# Patient Record
Sex: FEMALE | Race: WHITE | NOT HISPANIC OR LATINO | URBAN - METROPOLITAN AREA
[De-identification: names, ages, dates, MRNs, and addresses within clinical notes are randomized per-mention and may not be internally consistent; named-entity substitution may affect disease eponyms.]

---

## 2019-08-05 ENCOUNTER — LAB REQUISITION (OUTPATIENT)
Dept: LAB | Facility: HOSPITAL | Age: 29
End: 2019-08-05

## 2019-08-05 DIAGNOSIS — Z00.00 ROUTINE GENERAL MEDICAL EXAMINATION AT A HEALTH CARE FACILITY: ICD-10-CM

## 2019-08-05 PROCEDURE — 36415 COLL VENOUS BLD VENIPUNCTURE: CPT | Performed by: SPECIALIST

## 2019-09-02 ENCOUNTER — LAB REQUISITION (OUTPATIENT)
Dept: LAB | Facility: HOSPITAL | Age: 29
End: 2019-09-02

## 2019-09-02 DIAGNOSIS — Z00.00 ROUTINE GENERAL MEDICAL EXAMINATION AT A HEALTH CARE FACILITY: ICD-10-CM

## 2019-09-02 LAB — PROGEST SERPL-MCNC: 33.9 NG/ML

## 2019-09-02 PROCEDURE — 84144 ASSAY OF PROGESTERONE: CPT | Performed by: SPECIALIST

## 2019-09-02 PROCEDURE — 36415 COLL VENOUS BLD VENIPUNCTURE: CPT | Performed by: SPECIALIST

## 2019-10-09 ENCOUNTER — LAB REQUISITION (OUTPATIENT)
Dept: LAB | Facility: HOSPITAL | Age: 29
End: 2019-10-09

## 2019-10-09 DIAGNOSIS — Z00.00 ROUTINE GENERAL MEDICAL EXAMINATION AT A HEALTH CARE FACILITY: ICD-10-CM

## 2019-10-09 LAB — HCG INTACT+B SERPL-ACNC: 32.93 MIU/ML

## 2019-10-09 PROCEDURE — 84702 CHORIONIC GONADOTROPIN TEST: CPT | Performed by: SPECIALIST

## 2019-10-09 PROCEDURE — 36415 COLL VENOUS BLD VENIPUNCTURE: CPT | Performed by: SPECIALIST

## 2019-10-24 ENCOUNTER — LAB REQUISITION (OUTPATIENT)
Dept: LAB | Facility: HOSPITAL | Age: 29
End: 2019-10-24

## 2019-10-24 ENCOUNTER — TRANSCRIBE ORDERS (OUTPATIENT)
Dept: LAB | Facility: HOSPITAL | Age: 29
End: 2019-10-24

## 2019-10-24 DIAGNOSIS — O02.1 MISSED ABORTION: Primary | ICD-10-CM

## 2019-10-24 DIAGNOSIS — Z00.00 ROUTINE GENERAL MEDICAL EXAMINATION AT A HEALTH CARE FACILITY: ICD-10-CM

## 2019-10-24 DIAGNOSIS — N96 HABITUAL ABORTER: ICD-10-CM

## 2019-10-24 LAB
HCG INTACT+B SERPL-ACNC: 152.5 MIU/ML
PROLACTIN SERPL-MCNC: 32.7 NG/ML (ref 4.79–23.3)
TSH SERPL DL<=0.05 MIU/L-ACNC: 1.93 UIU/ML (ref 0.27–4.2)

## 2019-10-24 PROCEDURE — 84702 CHORIONIC GONADOTROPIN TEST: CPT | Performed by: SPECIALIST

## 2019-10-24 PROCEDURE — 84443 ASSAY THYROID STIM HORMONE: CPT | Performed by: SPECIALIST

## 2019-10-24 PROCEDURE — 84146 ASSAY OF PROLACTIN: CPT | Performed by: SPECIALIST

## 2019-10-24 PROCEDURE — 36415 COLL VENOUS BLD VENIPUNCTURE: CPT | Performed by: SPECIALIST

## 2019-11-04 ENCOUNTER — TRANSCRIBE ORDERS (OUTPATIENT)
Dept: LAB | Facility: HOSPITAL | Age: 29
End: 2019-11-04

## 2019-11-04 ENCOUNTER — LAB (OUTPATIENT)
Dept: LAB | Facility: HOSPITAL | Age: 29
End: 2019-11-04

## 2019-11-04 DIAGNOSIS — O02.1 MISSED ABORTION: ICD-10-CM

## 2019-11-04 DIAGNOSIS — O02.1 MISSED ABORTION: Primary | ICD-10-CM

## 2019-11-04 PROCEDURE — 84702 CHORIONIC GONADOTROPIN TEST: CPT

## 2019-11-04 PROCEDURE — 36415 COLL VENOUS BLD VENIPUNCTURE: CPT

## 2019-11-05 LAB — HCG INTACT+B SERPL-ACNC: 71.15 MIU/ML

## 2019-11-11 ENCOUNTER — LAB (OUTPATIENT)
Dept: LAB | Facility: HOSPITAL | Age: 29
End: 2019-11-11

## 2019-11-11 ENCOUNTER — TRANSCRIBE ORDERS (OUTPATIENT)
Dept: LAB | Facility: HOSPITAL | Age: 29
End: 2019-11-11

## 2019-11-11 DIAGNOSIS — O02.1 MISSED ABORTION: ICD-10-CM

## 2019-11-11 DIAGNOSIS — O02.1 MISSED ABORTION: Primary | ICD-10-CM

## 2019-11-11 LAB — HCG INTACT+B SERPL-ACNC: 35.83 MIU/ML

## 2019-11-11 PROCEDURE — 36415 COLL VENOUS BLD VENIPUNCTURE: CPT

## 2019-11-11 PROCEDURE — 84702 CHORIONIC GONADOTROPIN TEST: CPT

## 2019-11-18 ENCOUNTER — LAB REQUISITION (OUTPATIENT)
Dept: LAB | Facility: HOSPITAL | Age: 29
End: 2019-11-18

## 2019-11-18 DIAGNOSIS — Z00.00 ROUTINE GENERAL MEDICAL EXAMINATION AT A HEALTH CARE FACILITY: ICD-10-CM

## 2019-11-18 LAB — HCG INTACT+B SERPL-ACNC: 21.74 MIU/ML

## 2019-11-18 PROCEDURE — 36415 COLL VENOUS BLD VENIPUNCTURE: CPT | Performed by: SPECIALIST

## 2019-11-18 PROCEDURE — 84702 CHORIONIC GONADOTROPIN TEST: CPT | Performed by: SPECIALIST

## 2019-12-09 ENCOUNTER — LAB (OUTPATIENT)
Dept: LAB | Facility: HOSPITAL | Age: 29
End: 2019-12-09

## 2019-12-09 ENCOUNTER — TRANSCRIBE ORDERS (OUTPATIENT)
Dept: LAB | Facility: HOSPITAL | Age: 29
End: 2019-12-09

## 2019-12-09 DIAGNOSIS — N96 HABITUAL ABORTER: ICD-10-CM

## 2019-12-09 DIAGNOSIS — N96 HABITUAL ABORTER: Primary | ICD-10-CM

## 2019-12-09 PROCEDURE — 86147 CARDIOLIPIN ANTIBODY EA IG: CPT

## 2019-12-09 PROCEDURE — 36415 COLL VENOUS BLD VENIPUNCTURE: CPT

## 2019-12-11 LAB
CARDIOLIPIN IGA SER IA-ACNC: <9 APL U/ML (ref 0–11)
CARDIOLIPIN IGG SER IA-ACNC: <9 GPL U/ML (ref 0–14)
CARDIOLIPIN IGM SER IA-ACNC: 18 MPL U/ML (ref 0–12)

## 2020-01-27 ENCOUNTER — TRANSCRIBE ORDERS (OUTPATIENT)
Dept: LAB | Facility: HOSPITAL | Age: 30
End: 2020-01-27

## 2020-01-27 ENCOUNTER — APPOINTMENT (OUTPATIENT)
Dept: LAB | Facility: HOSPITAL | Age: 30
End: 2020-01-27

## 2020-01-27 DIAGNOSIS — Z32.00 ENCOUNTER FOR ASSESSMENT FOR SUSPECTED ECTOPIC PREGNANCY: Primary | ICD-10-CM

## 2020-01-27 LAB — HCG INTACT+B SERPL-ACNC: 203.1 MIU/ML

## 2020-01-27 PROCEDURE — 84702 CHORIONIC GONADOTROPIN TEST: CPT | Performed by: SPECIALIST

## 2020-01-27 PROCEDURE — 36415 COLL VENOUS BLD VENIPUNCTURE: CPT | Performed by: SPECIALIST

## 2020-03-09 ENCOUNTER — HOSPITAL ENCOUNTER (EMERGENCY)
Facility: HOSPITAL | Age: 30
Discharge: HOME | End: 2020-03-10
Attending: EMERGENCY MEDICINE | Admitting: EMERGENCY MEDICINE

## 2020-03-09 DIAGNOSIS — O03.4 INCOMPLETE MISCARRIAGE: ICD-10-CM

## 2020-03-09 DIAGNOSIS — O20.9 VAGINAL BLEEDING IN PREGNANCY, FIRST TRIMESTER: Primary | ICD-10-CM

## 2020-03-09 DIAGNOSIS — IMO0002 FETAL HEART TONES NOT HEARD: ICD-10-CM

## 2020-03-09 LAB
ABO GROUP BLD: NORMAL
BASOPHILS # BLD AUTO: 0.04 10*3/MM3 (ref 0–0.2)
BASOPHILS NFR BLD AUTO: 0.3 % (ref 0–1.5)
DEPRECATED RDW RBC AUTO: 41.8 FL (ref 37–54)
EOSINOPHIL # BLD AUTO: 0.15 10*3/MM3 (ref 0–0.4)
EOSINOPHIL NFR BLD AUTO: 1.2 % (ref 0.3–6.2)
ERYTHROCYTE [DISTWIDTH] IN BLOOD BY AUTOMATED COUNT: 13.4 % (ref 12.3–15.4)
HCG INTACT+B SERPL-ACNC: 3850 MIU/ML
HCT VFR BLD AUTO: 39.2 % (ref 34–46.6)
HGB BLD-MCNC: 12.8 G/DL (ref 12–15.9)
HOLD SPECIMEN: NORMAL
HOLD SPECIMEN: NORMAL
IMM GRANULOCYTES # BLD AUTO: 0.06 10*3/MM3 (ref 0–0.05)
IMM GRANULOCYTES NFR BLD AUTO: 0.5 % (ref 0–0.5)
LYMPHOCYTES # BLD AUTO: 3.35 10*3/MM3 (ref 0.7–3.1)
LYMPHOCYTES NFR BLD AUTO: 25.9 % (ref 19.6–45.3)
MCH RBC QN AUTO: 28.4 PG (ref 26.6–33)
MCHC RBC AUTO-ENTMCNC: 32.7 G/DL (ref 31.5–35.7)
MCV RBC AUTO: 87.1 FL (ref 79–97)
MONOCYTES # BLD AUTO: 0.49 10*3/MM3 (ref 0.1–0.9)
MONOCYTES NFR BLD AUTO: 3.8 % (ref 5–12)
NEUTROPHILS # BLD AUTO: 8.84 10*3/MM3 (ref 1.7–7)
NEUTROPHILS NFR BLD AUTO: 68.3 % (ref 42.7–76)
NRBC BLD AUTO-RTO: 0 /100 WBC (ref 0–0.2)
NUMBER OF DOSES: NORMAL
PLATELET # BLD AUTO: 379 10*3/MM3 (ref 140–450)
PMV BLD AUTO: 8.9 FL (ref 6–12)
RBC # BLD AUTO: 4.5 10*6/MM3 (ref 3.77–5.28)
RH BLD: POSITIVE
WBC NRBC COR # BLD: 12.93 10*3/MM3 (ref 3.4–10.8)
WHOLE BLOOD HOLD SPECIMEN: NORMAL
WHOLE BLOOD HOLD SPECIMEN: NORMAL

## 2020-03-09 PROCEDURE — 99283 EMERGENCY DEPT VISIT LOW MDM: CPT

## 2020-03-09 PROCEDURE — 86900 BLOOD TYPING SEROLOGIC ABO: CPT

## 2020-03-09 PROCEDURE — 85025 COMPLETE CBC W/AUTO DIFF WBC: CPT

## 2020-03-09 PROCEDURE — 86901 BLOOD TYPING SEROLOGIC RH(D): CPT

## 2020-03-09 PROCEDURE — 84702 CHORIONIC GONADOTROPIN TEST: CPT

## 2020-03-09 RX ORDER — ASPIRIN 81 MG/1
81 TABLET, CHEWABLE ORAL DAILY
COMMUNITY

## 2020-03-09 RX ORDER — SODIUM CHLORIDE 0.9 % (FLUSH) 0.9 %
10 SYRINGE (ML) INJECTION AS NEEDED
Status: DISCONTINUED | OUTPATIENT
Start: 2020-03-09 | End: 2020-03-10 | Stop reason: HOSPADM

## 2020-03-10 ENCOUNTER — APPOINTMENT (OUTPATIENT)
Dept: ULTRASOUND IMAGING | Facility: HOSPITAL | Age: 30
End: 2020-03-10

## 2020-03-10 VITALS
BODY MASS INDEX: 44.3 KG/M2 | SYSTOLIC BLOOD PRESSURE: 138 MMHG | HEIGHT: 63 IN | RESPIRATION RATE: 16 BRPM | HEART RATE: 100 BPM | WEIGHT: 250 LBS | OXYGEN SATURATION: 99 % | TEMPERATURE: 98.9 F | DIASTOLIC BLOOD PRESSURE: 78 MMHG

## 2020-03-10 LAB
BACTERIA UR QL AUTO: ABNORMAL /HPF
BILIRUB UR QL STRIP: NEGATIVE
CLARITY UR: CLEAR
COLOR UR: YELLOW
GLUCOSE UR STRIP-MCNC: NEGATIVE MG/DL
HGB UR QL STRIP.AUTO: ABNORMAL
HYALINE CASTS UR QL AUTO: ABNORMAL /LPF
KETONES UR QL STRIP: ABNORMAL
LEUKOCYTE ESTERASE UR QL STRIP.AUTO: ABNORMAL
NITRITE UR QL STRIP: NEGATIVE
PH UR STRIP.AUTO: 5.5 [PH] (ref 5–8)
PROT UR QL STRIP: NEGATIVE
RBC # UR: ABNORMAL /HPF
REF LAB TEST METHOD: ABNORMAL
SP GR UR STRIP: 1.02 (ref 1–1.03)
SQUAMOUS #/AREA URNS HPF: ABNORMAL /HPF
UROBILINOGEN UR QL STRIP: ABNORMAL
WBC UR QL AUTO: ABNORMAL /HPF

## 2020-03-10 PROCEDURE — 76817 TRANSVAGINAL US OBSTETRIC: CPT

## 2020-03-10 PROCEDURE — 81001 URINALYSIS AUTO W/SCOPE: CPT | Performed by: PHYSICIAN ASSISTANT

## 2020-03-10 NOTE — ED PROVIDER NOTES
Subjective   Karolyn Hebert is a 29 y.o.female who presents to the emergency department with complaints of pregnancy problem. She is currently 10.5 weeks pregnant. The patient states she was returning from a cruise yesterday morning when she developed abdominal cramping. After landing in [place], she started having vaginal bleeding. Initially, vaginal bleeding was light like spotting and then it got heavier like menses. She passed some clots during the bleeding but it has since improved and is now spotting again. She denies dysuria, urinary urgency and urinary frequency. She has been following with Dr. House, high risk OBGYN, but has began the transition to another OBGYN in [place] because she was released from the fertility clinic. Her LNMP was 19 and A2. She denies any recent strenuous activity.  She and  just flew back from a cruise to the [facility] and she relaxed the entire trip. There are no other acute complaints at this time.      History provided by:  Patient  Vaginal Bleeding - Pregnant   Quality:  Clots, dark red and lighter than menses  Severity:  Moderate  Onset quality:  Sudden  Timing:  Constant  Progression:  Improving  Chronicity:  New  Prior pregnancy: yes    Gestational age:  10.5 weeks  Prenatal care:  Regular prenatal care  Associated symptoms: abdominal pain (cramping; suprapubic)    Associated symptoms: no back pain, no dysuria and no nausea        Review of Systems   Gastrointestinal: Positive for abdominal pain (cramping; suprapubic). Negative for nausea and vomiting.   Genitourinary: Positive for vaginal bleeding. Negative for dysuria, flank pain, frequency and urgency.        LMP: 19.  Patient is [id number].  Issues with fertility in the past.   Musculoskeletal: Negative for back pain.   All other systems reviewed and are negative.      History reviewed. No pertinent past medical history.    No Known Allergies    Past Surgical History:   Procedure Laterality Date    • APPENDECTOMY     • CHOLECYSTECTOMY     • GASTRIC SLEEVE LAPAROSCOPIC         History reviewed. No pertinent family history.    Social History     Socioeconomic History   • Marital status:      Spouse name: Not on file   • Number of children: Not on file   • Years of education: Not on file   • Highest education level: Not on file   Tobacco Use   • Smoking status: Never Smoker   Substance and Sexual Activity   • Alcohol use: Never     Frequency: Never   • Drug use: Never   • Sexual activity: Yes     Partners: Male         Objective   Physical Exam   Constitutional: She is oriented to person, place, and time. She appears well-developed and well-nourished.   HENT:   Head: Normocephalic and atraumatic.   Nose: Nose normal.   Eyes: Conjunctivae are normal. No scleral icterus.   Neck: Normal range of motion. Neck supple.   Cardiovascular: Normal rate, regular rhythm and normal heart sounds.   Pulmonary/Chest: Effort normal and breath sounds normal. No respiratory distress.   Abdominal: Soft. Bowel sounds are normal. She exhibits no distension. There is no tenderness. There is no CVA tenderness (or flank).   Central obesity noted.  No suprapubic or abdominal TTP.  No flank or CVA TTP.   Genitourinary:   Genitourinary Comments: Patient deferred on pelvic exam after she returned from ultrasound and I updated her on those results. She said vaginal spotting is light and defers on pelvic exam.   Musculoskeletal: Normal range of motion.   Neurological: She is alert and oriented to person, place, and time.   Skin: Skin is warm and dry.   Psychiatric: She has a normal mood and affect. Her behavior is normal.   Nursing note and vitals reviewed.      Procedures         ED Course  ED Course as of Mar 10 0234   Tue Mar 10, 2020   0222 CBC revealed H&H of 12 and 39.  Rh factor is positive.  Quant hCG is 3850, which is lower than expected for approximately 10 weeks gestation.  Urinalysis reveals large 3+ blood, 31-50 red blood  cells, 3-5 white blood cells, and no bacteria.  Transvaginal ultrasound shows a gestational sac and fetal pole present but no fetal heart tones could be obtained despite multiple attempts to document fetal heart tones.  Based on this ultrasound, imaging findings most likely reflect fetal demise/blighted ovum, particularly when correlated with beta hCG levels.  The gestational sac measures approximately 7 weeks and 3 days, and patient is approximately 10 weeks gestation according to last menses.  Unremarkable right ovary and left ovary could not be visualized.  I updated patient on all results.  I was going to do pelvic exam, but patient says that she is only spotting at this point and defers on pelvic exam after hearing ultrasound results.  She will call Dr. House first thing in the morning and have close follow-up regarding fetal demise.    [FC]      ED Course User Index  [FC] Melani Kennedy, SLIM     Recent Results (from the past 24 hour(s))   hCG, Quantitative, Pregnancy    Collection Time: 20 10:18 PM   Result Value Ref Range    HCG Quantitative 3,850.00 mIU/mL    RhIg Evaluation    Collection Time: 20 10:18 PM   Result Value Ref Range    ABO Type O     RH type Positive    Doses of Rh Immune Globulin    Collection Time: 20 10:18 PM   Result Value Ref Range    Number of Doses       RhIg is not indicated due to the patient's Rh status   Light Blue Top    Collection Time: 20 10:18 PM   Result Value Ref Range    Extra Tube hold for add-on    Green Top (Gel)    Collection Time: 20 10:18 PM   Result Value Ref Range    Extra Tube Hold for add-ons.    Lavender Top    Collection Time: 20 10:18 PM   Result Value Ref Range    Extra Tube hold for add-on    Gold Top - SST    Collection Time: 20 10:18 PM   Result Value Ref Range    Extra Tube Hold for add-ons.    CBC Auto Differential    Collection Time: 20 10:18 PM   Result Value Ref Range    WBC 12.93 (H) 3.40 -  10.80 10*3/mm3    RBC 4.50 3.77 - 5.28 10*6/mm3    Hemoglobin 12.8 12.0 - 15.9 g/dL    Hematocrit 39.2 34.0 - 46.6 %    MCV 87.1 79.0 - 97.0 fL    MCH 28.4 26.6 - 33.0 pg    MCHC 32.7 31.5 - 35.7 g/dL    RDW 13.4 12.3 - 15.4 %    RDW-SD 41.8 37.0 - 54.0 fl    MPV 8.9 6.0 - 12.0 fL    Platelets 379 140 - 450 10*3/mm3    Neutrophil % 68.3 42.7 - 76.0 %    Lymphocyte % 25.9 19.6 - 45.3 %    Monocyte % 3.8 (L) 5.0 - 12.0 %    Eosinophil % 1.2 0.3 - 6.2 %    Basophil % 0.3 0.0 - 1.5 %    Immature Grans % 0.5 0.0 - 0.5 %    Neutrophils, Absolute 8.84 (H) 1.70 - 7.00 10*3/mm3    Lymphocytes, Absolute 3.35 (H) 0.70 - 3.10 10*3/mm3    Monocytes, Absolute 0.49 0.10 - 0.90 10*3/mm3    Eosinophils, Absolute 0.15 0.00 - 0.40 10*3/mm3    Basophils, Absolute 0.04 0.00 - 0.20 10*3/mm3    Immature Grans, Absolute 0.06 (H) 0.00 - 0.05 10*3/mm3    nRBC 0.0 0.0 - 0.2 /100 WBC   Urinalysis With Microscopic If Indicated (No Culture) - Urine, Clean Catch    Collection Time: 03/10/20 12:33 AM   Result Value Ref Range    Color, UA Yellow Yellow, Straw    Appearance, UA Clear Clear    pH, UA 5.5 5.0 - 8.0    Specific Gravity, UA 1.023 1.001 - 1.030    Glucose, UA Negative Negative    Ketones, UA Trace (A) Negative    Bilirubin, UA Negative Negative    Blood, UA Large (3+) (A) Negative    Protein, UA Negative Negative    Leuk Esterase, UA Trace (A) Negative    Nitrite, UA Negative Negative    Urobilinogen, UA 0.2 E.U./dL 0.2 - 1.0 E.U./dL   Urinalysis, Microscopic Only - Urine, Clean Catch    Collection Time: 03/10/20 12:33 AM   Result Value Ref Range    RBC, UA 31-50 (A) None Seen, 0-2 /HPF    WBC, UA 3-5 (A) None Seen, 0-2 /HPF    Bacteria, UA None Seen None Seen, Trace /HPF    Squamous Epithelial Cells, UA 0-2 None Seen, 0-2 /HPF    Hyaline Casts, UA 0-6 0 - 6 /LPF    Methodology Automated Microscopy      Note: In addition to lab results from this visit, the labs listed above may include labs taken at another facility or during a different  "encounter within the last 24 hours. Please correlate lab times with ED admission and discharge times for further clarification of the services performed during this visit.    US Ob Transvaginal   Final Result      1. A gestational sac and fetal pole are present but no fetal heart tones could be obtained despite multiple attempts to document fetal heart tones. Based on this ultrasound, imaging findings most likely reflect fetal demise/blighted ovum, particularly   when correlated with beta hCG levels.. Suggest OB/GYN referral for further care and management.   2. Unremarkable right ovary. The left ovary could not be visualized or assessed.      Signer Name: Luis Pradhan MD    Signed: 3/10/2020 1:49 AM    Workstation Name: [email or web address]     [facility]        Vitals:    03/09/20 2211 03/09/20 2352 03/09/20 2353 03/10/20 0030   BP: 150/84 135/63  142/78   BP Location: Left arm      Patient Position: Sitting      Pulse: 96      Resp: 16      Temp: 99.1 °F (37.3 °C)      TempSrc: Oral      SpO2: 100%  100% 96%   Weight: 113 kg (250 lb)      Height: 160 cm (63\")        Medications   sodium chloride 0.9 % flush 10 mL (has no administration in time range)     ECG/EMG Results (last 24 hours)     ** No results found for the last 24 hours. **        No orders to display                                              MDM    Final diagnoses:   Vaginal bleeding in pregnancy, first trimester   Incomplete miscarriage   Fetal heart tones not heard       Documentation assistance provided by angelika Sagastume.  Information recorded by the scribe was done at my direction and has been verified and validated by me.     Rafael Sagastume  03/10/20 0045       Melani Kennedy PA-C  03/10/20 0234    "

## 2020-03-10 NOTE — DISCHARGE INSTRUCTIONS
ER evaluation reveals quant hCG of 3850, which is lower than expected for patient's gestational age according to last menstrual period.  Transvaginal ultrasound reveals gestational sac measuring approximately 7 weeks and 3 days.  There is a fetal pole but no evidence of fetal heart tones after multiple attempts.  Recommend first available follow-up with patient's OB/GYN, Dr. House.  Recommend increase fluids and pelvic rest.  Return to the ER if any heavier vaginal bleeding at greater than 2 pads per hour.

## 2020-05-14 ENCOUNTER — TRANSCRIBE ORDERS (OUTPATIENT)
Dept: LAB | Facility: HOSPITAL | Age: 30
End: 2020-05-14

## 2020-05-14 ENCOUNTER — LAB (OUTPATIENT)
Dept: LAB | Facility: HOSPITAL | Age: 30
End: 2020-05-14

## 2020-05-14 DIAGNOSIS — Z32.00 PREGNANCY EXAMINATION OR TEST, PREGNANCY UNCONFIRMED: ICD-10-CM

## 2020-05-14 DIAGNOSIS — Z32.00 PREGNANCY EXAMINATION OR TEST, PREGNANCY UNCONFIRMED: Primary | ICD-10-CM

## 2020-05-14 LAB — HCG INTACT+B SERPL-ACNC: 392.6 MIU/ML

## 2020-05-14 PROCEDURE — 36415 COLL VENOUS BLD VENIPUNCTURE: CPT

## 2020-05-14 PROCEDURE — 84702 CHORIONIC GONADOTROPIN TEST: CPT

## 2020-05-18 ENCOUNTER — TRANSCRIBE ORDERS (OUTPATIENT)
Dept: LAB | Facility: HOSPITAL | Age: 30
End: 2020-05-18

## 2020-05-18 ENCOUNTER — LAB (OUTPATIENT)
Dept: LAB | Facility: HOSPITAL | Age: 30
End: 2020-05-18

## 2020-05-18 DIAGNOSIS — Z32.01 PREGNANCY EXAMINATION OR TEST, POSITIVE RESULT: Primary | ICD-10-CM

## 2020-05-18 DIAGNOSIS — Z32.01 PREGNANCY EXAMINATION OR TEST, POSITIVE RESULT: ICD-10-CM

## 2020-05-18 LAB — HCG INTACT+B SERPL-ACNC: 2479 MIU/ML

## 2020-05-18 PROCEDURE — 84702 CHORIONIC GONADOTROPIN TEST: CPT

## 2020-05-18 PROCEDURE — 36415 COLL VENOUS BLD VENIPUNCTURE: CPT
